# Patient Record
Sex: MALE | Race: WHITE | NOT HISPANIC OR LATINO | Employment: OTHER | URBAN - METROPOLITAN AREA
[De-identification: names, ages, dates, MRNs, and addresses within clinical notes are randomized per-mention and may not be internally consistent; named-entity substitution may affect disease eponyms.]

---

## 2017-01-01 ENCOUNTER — OFFICE VISIT (OUTPATIENT)
Dept: FAMILY MEDICINE | Facility: CLINIC | Age: 77
End: 2017-01-01
Payer: MEDICARE

## 2017-01-01 ENCOUNTER — TELEPHONE (OUTPATIENT)
Dept: CARDIOLOGY | Facility: CLINIC | Age: 77
End: 2017-01-01

## 2017-01-01 ENCOUNTER — TELEPHONE (OUTPATIENT)
Dept: FAMILY MEDICINE | Facility: CLINIC | Age: 77
End: 2017-01-01

## 2017-01-01 ENCOUNTER — OFFICE VISIT (OUTPATIENT)
Dept: CARDIOLOGY | Facility: CLINIC | Age: 77
End: 2017-01-01
Payer: MEDICARE

## 2017-01-01 ENCOUNTER — LAB VISIT (OUTPATIENT)
Dept: LAB | Facility: HOSPITAL | Age: 77
End: 2017-01-01
Attending: INTERNAL MEDICINE
Payer: MEDICARE

## 2017-01-01 ENCOUNTER — NURSE TRIAGE (OUTPATIENT)
Dept: ADMINISTRATIVE | Facility: CLINIC | Age: 77
End: 2017-01-01

## 2017-01-01 VITALS
HEART RATE: 79 BPM | RESPIRATION RATE: 18 BRPM | DIASTOLIC BLOOD PRESSURE: 80 MMHG | OXYGEN SATURATION: 97 % | HEIGHT: 71 IN | WEIGHT: 174.81 LBS | SYSTOLIC BLOOD PRESSURE: 114 MMHG | BODY MASS INDEX: 24.47 KG/M2

## 2017-01-01 VITALS
OXYGEN SATURATION: 99 % | WEIGHT: 176.81 LBS | HEART RATE: 80 BPM | SYSTOLIC BLOOD PRESSURE: 106 MMHG | HEIGHT: 71 IN | DIASTOLIC BLOOD PRESSURE: 78 MMHG | BODY MASS INDEX: 24.75 KG/M2

## 2017-01-01 DIAGNOSIS — I35.0 SEVERE AORTIC STENOSIS: Chronic | ICD-10-CM

## 2017-01-01 DIAGNOSIS — R60.0 EDEMA OF LEFT LOWER EXTREMITY: ICD-10-CM

## 2017-01-01 DIAGNOSIS — I35.0 AORTIC VALVE STENOSIS, UNSPECIFIED ETIOLOGY: ICD-10-CM

## 2017-01-01 DIAGNOSIS — I35.0 AORTIC VALVE STENOSIS, UNSPECIFIED ETIOLOGY: Primary | ICD-10-CM

## 2017-01-01 DIAGNOSIS — N18.9 CHRONIC KIDNEY DISEASE, UNSPECIFIED STAGE: ICD-10-CM

## 2017-01-01 DIAGNOSIS — H61.23 BILATERAL IMPACTED CERUMEN: Primary | ICD-10-CM

## 2017-01-01 LAB
ANION GAP SERPL CALC-SCNC: 7 MMOL/L
APTT BLDCRRT: 23.9 SEC
BUN SERPL-MCNC: 20 MG/DL
CALCIUM SERPL-MCNC: 9.2 MG/DL
CHLORIDE SERPL-SCNC: 101 MMOL/L
CO2 SERPL-SCNC: 28 MMOL/L
CREAT SERPL-MCNC: 1.4 MG/DL
ERYTHROCYTE [DISTWIDTH] IN BLOOD BY AUTOMATED COUNT: 16.5 %
EST. GFR  (AFRICAN AMERICAN): 56.8 ML/MIN/1.73 M^2
EST. GFR  (NON AFRICAN AMERICAN): 49.1 ML/MIN/1.73 M^2
GLUCOSE SERPL-MCNC: 197 MG/DL
HCT VFR BLD AUTO: 39.1 %
HGB BLD-MCNC: 12.5 G/DL
INR PPP: 1.1
MCH RBC QN AUTO: 26.9 PG
MCHC RBC AUTO-ENTMCNC: 32 %
MCV RBC AUTO: 84 FL
PLATELET # BLD AUTO: 281 K/UL
PMV BLD AUTO: 10.4 FL
POTASSIUM SERPL-SCNC: 4.1 MMOL/L
PROTHROMBIN TIME: 11.5 SEC
RBC # BLD AUTO: 4.64 M/UL
SODIUM SERPL-SCNC: 136 MMOL/L
WBC # BLD AUTO: 8.65 K/UL

## 2017-01-01 PROCEDURE — 3078F DIAST BP <80 MM HG: CPT | Mod: S$GLB,,, | Performed by: INTERNAL MEDICINE

## 2017-01-01 PROCEDURE — 3079F DIAST BP 80-89 MM HG: CPT | Mod: S$GLB,,, | Performed by: NURSE PRACTITIONER

## 2017-01-01 PROCEDURE — 1157F ADVNC CARE PLAN IN RCRD: CPT | Mod: S$GLB,,, | Performed by: NURSE PRACTITIONER

## 2017-01-01 PROCEDURE — 99213 OFFICE O/P EST LOW 20 MIN: CPT | Mod: S$GLB,,, | Performed by: NURSE PRACTITIONER

## 2017-01-01 PROCEDURE — 1160F RVW MEDS BY RX/DR IN RCRD: CPT | Mod: S$GLB,,, | Performed by: NURSE PRACTITIONER

## 2017-01-01 PROCEDURE — 99215 OFFICE O/P EST HI 40 MIN: CPT | Mod: S$GLB,,, | Performed by: INTERNAL MEDICINE

## 2017-01-01 PROCEDURE — 1157F ADVNC CARE PLAN IN RCRD: CPT | Mod: S$GLB,,, | Performed by: INTERNAL MEDICINE

## 2017-01-01 PROCEDURE — 1125F AMNT PAIN NOTED PAIN PRSNT: CPT | Mod: S$GLB,,, | Performed by: INTERNAL MEDICINE

## 2017-01-01 PROCEDURE — 99999 PR PBB SHADOW E&M-EST. PATIENT-LVL IV: CPT | Mod: PBBFAC,,, | Performed by: NURSE PRACTITIONER

## 2017-01-01 PROCEDURE — 99999 PR PBB SHADOW E&M-EST. PATIENT-LVL III: CPT | Mod: PBBFAC,,,

## 2017-01-01 PROCEDURE — 1125F AMNT PAIN NOTED PAIN PRSNT: CPT | Mod: S$GLB,,, | Performed by: NURSE PRACTITIONER

## 2017-01-01 PROCEDURE — 99499 UNLISTED E&M SERVICE: CPT | Mod: S$GLB,,, | Performed by: INTERNAL MEDICINE

## 2017-01-01 PROCEDURE — 1160F RVW MEDS BY RX/DR IN RCRD: CPT | Mod: S$GLB,,, | Performed by: INTERNAL MEDICINE

## 2017-01-01 PROCEDURE — 3074F SYST BP LT 130 MM HG: CPT | Mod: S$GLB,,, | Performed by: INTERNAL MEDICINE

## 2017-01-01 PROCEDURE — 3074F SYST BP LT 130 MM HG: CPT | Mod: S$GLB,,, | Performed by: NURSE PRACTITIONER

## 2017-01-01 PROCEDURE — 1159F MED LIST DOCD IN RCRD: CPT | Mod: S$GLB,,, | Performed by: INTERNAL MEDICINE

## 2017-01-01 PROCEDURE — 1159F MED LIST DOCD IN RCRD: CPT | Mod: S$GLB,,, | Performed by: NURSE PRACTITIONER

## 2017-01-01 RX ORDER — LISINOPRIL 5 MG/1
TABLET ORAL
Status: ON HOLD | COMMUNITY
Start: 2016-01-01 | End: 2017-01-01 | Stop reason: HOSPADM

## 2017-01-01 RX ORDER — HUMAN INSULIN 100 [USP'U]/ML
INJECTION, SUSPENSION SUBCUTANEOUS
COMMUNITY
Start: 2017-01-01 | End: 2017-01-01 | Stop reason: SDUPTHER

## 2017-01-11 NOTE — TELEPHONE ENCOUNTER
Await cardiac visit on 1/16.   Will not be able to have valve replacement with sugar elevated.  Needs to be consistent with insulin use.

## 2017-01-11 NOTE — TELEPHONE ENCOUNTER
"Called Lashay with [facility], pt has a concern is his weight loss. 12/24/16 weight was 180. 12/26/17 weight was 179. 1/11/17 weight was 170. He stated he doesn't have an appetite. 1/11/17 his blood sugar was 302. 1/6/17 BS was 277. 1/3/17 BS was 160 and 1/1/17 BS was 293.   He stated he don't take his meds all the time because he is "lazy". Takes insulin when he wants to.     2+ pitting edema on left foot, Doesn't have fluid all the time on left leg. he c/o sob at night. 1/16/17 going to the valve clinic to goal is to have heart valve replacement.    He has a wound to his left index finger, they got wound care orders from wound care clinic.      She wants to know if you want to do an over night pulse ox ??   "

## 2017-01-16 NOTE — PROGRESS NOTES
Cardiology Clinic Note  Reason for Visit: TAVR evaluation     Referring MD: Haydee     HPI:   76 year old male with history of CAD, arthritis, DM, HTN and severe AS is here for TAVR evaluation. He is unable to walk due to leg weakness and instability which has not been evaluated.  He lives alone but only walks around the house with a walker to the bathroom.  Doesn't exercise.  Was sent to SNF for prehab, but didn't accomplish this.  Also, still has an infected finger on his L hand from an accident. He was re-scheduled for TAVR on December 6th, however he was admitted from SNF on 11/18/16 with weakness and recurrent GIB and anemia.   He is s/p multiple GIB with multiple transfusions and polypectomy with successful cessation of GIB. He was Cohort C and too frail to undergo TAVR and is still cohort C. He was sent to Rehab and the plan was to come back to the clinic in a month to see if he has sufficiently rehabbed, but he didn't. Recurrent syncope resolved after PPM.      He has undergone the following work-up:      ¨ Echo (9/14/16): TONIO = 0.62 cm2, peak velocity = 4.82 m/s, mean gradient = 53.0 mmHg, EF= 60%   ¨ Severe, symptomatic AS, NHYA CLAss III.  ¨ LHC (9/15/16): s/p distal RCA and PLB BMS, otherwise non-obstructive CAD by [facility] on 9/12  ¨ STS= 2.7%  ¨ Frailty: 4/4  ¨ Iliacs are >8.3 on R and >7.8 on L  ¨ LVOT per SRR: Area= 4.38 cm2, Avg Diam= 23.6 (22.7 x 26.3 mm).   ¨ He is high risk per Dr. Burleson and Dr. Watson due to ascending aorta calcification. Currently Cohort C due to frailty and immobility.  ¨ Screen failure for Portico for GIB.  ¨ PPM in situ       He is a candidate for 29 mm  Evolut  via RTF access if he Prehabs successfully.      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 ENDO (2ND FLR);  Service: Endoscopy;  Laterality: N/A;    Colonoscopy N/A 10/14/2016     Procedure: COLONOSCOPY;  Surgeon: Carmen Alberto MD;  Location: [facility] ENDO (2ND FLR);  Service: Endoscopy;  Laterality: N/A;    Colonoscopy N/A 11/2/2016     Procedure: COLONOSCOPY;  Surgeon: Song Multani MD;  Location: [facility] ENDO (2ND FLR);  Service: Endoscopy;  Laterality: N/A;  GI bleeding melena and two last colonoscopies poor prep and patient past due for colonoscopy for history of colon villous adenoma surveillance.     Allergies:     Review of patient's allergies indicates:   Allergen Reactions    Adhesive Rash     Coban, severe rash     Medications:     Current Outpatient Prescriptions on File Prior to Visit   Medication Sig Dispense Refill    aspirin 81 MG Chew Take 1 tablet (81 mg total) by mouth once daily.  0    atorvastatin (LIPITOR) 80 MG tablet Take 1 tablet (80 mg total) by mouth once daily. 90 tablet 3    clopidogrel (PLAVIX) 75 mg tablet Take 1 tablet (75 mg total) by mouth once daily. 30 tablet 11    ferrous sulfate 325 (65 FE) MG EC tablet Take 1 tablet (325 mg total) by mouth 3 (three) times daily. 90 tablet 0    gabapentin (NEURONTIN) 300 MG capsule Take 1 capsule (300 mg total) by mouth 3 (three) times daily. 90 capsule 11    insulin NPH and regular human (HUMULIN 70/30 KWIKPEN) 100 unit/mL (70-30) InPn pen Inject 18 units into the skin before breakfast and 9 units before dinner 3 Box 3    miconazole nitrate 2% (MICOTIN) 2 % Oint  "Apply topically 2 (two) times daily. Apply to affected areas  0    midodrine (PROAMATINE) 5 MG Tab Take 1 tablet (5 mg total) by mouth 3 (three) times daily. 90 tablet 3    nitroGLYCERIN (NITROSTAT) 0.4 MG SL tablet Place 1 tablet (0.4 mg total) under the tongue every 5 (five) minutes as needed for Chest pain. 10 tablet 3    pantoprazole (PROTONIX) 40 MG tablet Take 1 tablet (40 mg total) by mouth 2 (two) times daily before meals. 60 tablet 2    insulin syringe-needle U-100 1 mL 29 gauge x 1/2" Syrg        No current facility-administered medications on file prior to visit.      Social History:     Social History   Substance Use Topics    Smoking status: Former Smoker    Smokeless tobacco: Not on file    Alcohol use No     Family History:     Family History   Problem Relation Age of Onset    Kidney disease Brother     No Known Problems Mother     No Known Problems Father     No Known Problems Sister     No Known Problems Maternal Aunt     No Known Problems Maternal Uncle     No Known Problems Paternal Aunt     No Known Problems Paternal Uncle     No Known Problems Maternal Grandmother     No Known Problems Maternal Grandfather     No Known Problems Paternal Grandmother     No Known Problems Paternal Grandfather     Anemia Neg Hx     Arrhythmia Neg Hx     Asthma Neg Hx     Clotting disorder Neg Hx     Fainting Neg Hx     Heart attack Neg Hx     Heart disease Neg Hx     Heart failure Neg Hx     Hyperlipidemia Neg Hx     Hypertension Neg Hx     Stroke Neg Hx     Atrial Septal Defect Neg Hx      Physical Exam:     Visit Vitals    /78 (BP Location: Left arm, Patient Position: Sitting, BP Method: Manual)    Pulse 80    Ht 5' 10.5" (1.791 m)    Wt 80.2 kg (176 lb 12.9 oz)    SpO2 99%    BMI 25.01 kg/m2        Constitutional: No acute distress, conversant  HEENT: Sclera anicteric, Pupils equal, round and reactive to light, extraocular motions intact, Oropharynx clear  Neck: No JVD, " no carotid bruits  Cardiovascular: regular rate and rhythm, mid systolic murmur 2nd ICS, rubs or gallops, normal S1/S2  Pulmonary: Clear to auscultation bilaterally  Abdominal: Abdomen soft, nontender, nondistended, positive bowel sounds  Extremities: bilateral lower extremity edema,   Psych: Alert and oriented x 3, appropriate affect  Neuro: CNII-XII intact, no focal deficits    Labs:     Lab Results   Component Value Date     2016    K 5.4 (H) 2016     2016    CO2 26 2016    BUN 25 (H) 2016    CREATININE 1.3 2016    ANIONGAP 10 2016     Lab Results   Component Value Date    AST 20 2016    ALT 25 2016    ALKPHOS 169 (H) 2016    BILITOT 0.8 2016    ALBUMIN 3.4 (L) 2016     Lab Results   Component Value Date    CALCIUM 9.0 2016    MG 2.0 2016    PHOS 3.6 2016     Lab Results   Component Value Date    BNP 1194 (H) 10/28/2016    BNP 71 2011     (H) 2010    Lab Results   Component Value Date    WBC 8.65 2017    HGB 12.5 (L) 2017    HCT 39.1 (L) 2017     2017    GRAN 5.9 2016    GRAN 75.4 (H) 2016     Lab Results   Component Value Date    INR 1.1 2017    INR 1.1 10/04/2016     Lab Results   Component Value Date    CHOL 296 (H) 2016    HDL 45 2016    LDLCALC 198.0 (H) 2016    TRIG 265 (H) 2016     Lab Results   Component Value Date    HGBA1C 6.7 (H) 10/29/2016     Lab Results   Component Value Date    TSH 1.248 2016          Imagin/14/16:  1 - Severe aortic stenosis, TONIO = 0.62 cm2, peak velocity = 4.82 m/s, mean gradient = 53.0 mmHg.     2 - Mild aortic regurgitation.     3 - Concentric remodeling.     4 - Normal left ventricular systolic function (EF 60-65%).     5 - Mild mitral regurgitation.     6 - Severe left atrial enlargement.     7 - The estimated PA systolic pressure is 31 mmHg.     8 - Right ventricular  enlargement with hypertrophy, with normal systolic function.     9 - Mild tricuspid regurgitation.     10 - Intermediate central venous pressure.    EF   Date Value Ref Range Status   09/14/2016 60 55 - 65    09/09/2016 50 55 - 65    09/11/2015 55 55 - 65        EKG: a paced with RBBB   Assessment:   1. Severe AS (NYHA Class II sx).      He has undergone the following work-up:      ¨ Echo (9/14/16): TONIO = 0.62 cm2, peak velocity = 4.82 m/s, mean gradient = 53.0 mmHg, EF= 60%   ¨ Severe, symptomatic AS, NHYA CLAss III.  ¨ LHC (9/15/16): s/p distal RCA and PLB BMS, otherwise non-obstructive CAD by LHC on 9/12  ¨ STS= 2.7%  ¨ Frailty: 4/4  ¨ Iliacs are >8.3 on R and >7.8 on L  ¨ LVOT per SRR: Area= 4.38 cm2, Avg Diam= 23.6 (22.7 x 26.3 mm).   ¨ He is high risk per Dr. Burleson and Dr. Watson due to ascending aorta calcification. Currently Cohort C due to frailty and immobility.  ¨ Screen failure for Portico for GIB.  ¨ PPM in situ       He is a candidate for 29 mm  Evolut  via RTF access if he Prehabs successfully.       2. Left 2nd finger is healing according to the patient         3. Still remains 4/4 frail, lives alone and there is also question of his adherence with medications   4. PPM in situ for syncope and CHB  5. Fraily        Plan:   1. Continue on current medications  2. He and his nephew were counseled about having him walk as much as possible  3. Will follow him up on 3/6/17 to see if his frailty has improved and then schedule him for TAVR  4. He will follow up with his PCP to arrange to get his gait evaluated and appropriate referral.   5. Pt seen and case discussed with Dr Guadalupe     Signed:  Jacek Mann MD  Interventional / Structural  Cardiology Fellow  Beeper:  [phone]  1/16/2017 1:20 PM    Staff:  I have personally taken the history and examined this patient and agree with the fellow's note as stated above and amended it accordingly :-)    Patient is cohort C and will try for a third time to prehab  at home.  Marques has agreed to help us with him.  Will do TAVR if he successfully prehabs.    Follow-up:     Future Appointments  Date Time Provider Department Center   1/17/2017 3:00 PM Mekhi Sharma MD [facility] - OPP   3/15/2017 10:15 AM Mikey Lange MD [facility]

## 2017-01-16 NOTE — TELEPHONE ENCOUNTER
Called Lashay, advised of Dr. Lange message and she verbally understood.     Pt is seeing valve clinic today, please advise.

## 2017-01-20 NOTE — PROGRESS NOTES
Subjective:       Patient ID: Kevin Ruff is a 77 y.o. male.    Chief Complaint: Edema (fluid in feet)    Edema   This is a recurrent problem. The current episode started more than 1 year ago. The problem occurs constantly. The problem has been waxing and waning. Pertinent negatives include no abdominal pain, anorexia, arthralgias, change in bowel habit, chest pain, chills, congestion, coughing, fatigue, fever, headaches, joint swelling, myalgias, nausea, neck pain, numbness, rash, sore throat, swollen glands, urinary symptoms, vertigo, visual change, vomiting or weakness.     Review of Systems   Constitutional: Negative for chills, fatigue and fever.   HENT: Negative for congestion and sore throat.    Respiratory: Negative for cough.    Cardiovascular: Negative for chest pain.   Gastrointestinal: Negative for abdominal pain, anorexia, change in bowel habit, nausea and vomiting.   Musculoskeletal: Negative for arthralgias, joint swelling, myalgias and neck pain.   Skin: Negative for rash.   Neurological: Negative for vertigo, weakness, numbness and headaches.       Objective:      Physical Exam   Constitutional: He is oriented to person, place, and time. He appears well-nourished.   HENT:   Head: Normocephalic and atraumatic.   Nose: Nose normal.   Mouth/Throat: Oropharynx is clear and moist. No oropharyngeal exudate.   Impacted bilateral ear canals with cerumen   Eyes: Conjunctivae and EOM are normal. Pupils are equal, round, and reactive to light.   Neck: Normal range of motion. Neck supple.   Cardiovascular: Normal rate, regular rhythm, normal heart sounds and intact distal pulses.    Pulmonary/Chest: Effort normal and breath sounds normal. He has no wheezes. He has no rales.   Musculoskeletal:        Right lower leg: He exhibits edema.        Left lower leg: He exhibits edema.   Lymphadenopathy:     He has no cervical adenopathy.   Neurological: He is alert and oriented to person, place, and time.   Skin:  Skin is warm and dry. No rash noted.   Vitals reviewed.      Assessment:       1. Bilateral impacted cerumen    2. Edema of left lower extremity        Plan:       Kevin was seen today for edema.    Diagnoses and all orders for this visit:    Bilateral impacted cerumen  -     Ambulatory referral to ENT    Edema of left lower extremity  Compression stockings.   Elevate extremities

## 2017-01-20 NOTE — MR AVS SNAPSHOT
[facility]  [address]  [place] [address]  Phone: [phone]  Fax: [phone]                  Kevin Ruff   2017 10:20 AM   Office Visit    Description:  Male : 1940   Provider:  Madhavi Patton NP   Department:  [facility]           Reason for Visit     Edema           Diagnoses this Visit        Comments    Bilateral impacted cerumen    -  Primary            To Do List           Future Appointments        Provider Department Dept Phone    2/3/2017 11:30 AM AUDIO, [facility] - Audiology [phone]    2/3/2017 1:00 PM Neli Keith NP [facility] -[phone]    3/6/2017 1:30 PM [facility] [phone]    3/15/2017 10:15 AM Mikey Lange MD [facility] [phone]      Goals (5 Years of Data)     None      [facility]     [facility] Care Line - 24/7 Assistance  Registered nurses in the [facility] provide clinical advisement, health education, appointment booking, and other advisory services.  Call for this free service at [phone].             Medications           Message regarding Medications     Verify the changes and/or additions to your medication regime listed below are the same as discussed with your clinician today.  If any of these changes or additions are incorrect, please notify your healthcare provider.        STOP taking these medications     NOVOLIN 70/30 100 unit/mL (70-30) injection            Verify that the below list of medications is an accurate representation of the medications you are currently taking.  If none reported, the list may be blank. If incorrect, please contact your healthcare provider. Carry this list with you in case of emergency.           Current Medications     aspirin 81 MG Chew Take 1 tablet (81 mg total) by mouth once daily.    atorvastatin (LIPITOR) 80 MG tablet Take 1 tablet (80 mg  "total) by mouth once daily.    clopidogrel (PLAVIX) 75 mg tablet Take 1 tablet (75 mg total) by mouth once daily.    ferrous sulfate 325 (65 FE) MG EC tablet Take 1 tablet (325 mg total) by mouth 3 (three) times daily.    gabapentin (NEURONTIN) 300 MG capsule Take 1 capsule (300 mg total) by mouth 3 (three) times daily.    insulin NPH and regular human (HUMULIN 70/30 KWIKPEN) 100 unit/mL (70-30) InPn pen Inject 18 units into the skin before breakfast and 9 units before dinner    insulin syringe-needle U-100 1 mL 29 gauge x 1/2" Syrg     lisinopril (PRINIVIL,ZESTRIL) 5 MG tablet     miconazole nitrate 2% (MICOTIN) 2 % Oint Apply topically 2 (two) times daily. Apply to affected areas    midodrine (PROAMATINE) 5 MG Tab Take 1 tablet (5 mg total) by mouth 3 (three) times daily.    nitroGLYCERIN (NITROSTAT) 0.4 MG SL tablet Place 1 tablet (0.4 mg total) under the tongue every 5 (five) minutes as needed for Chest pain.    pantoprazole (PROTONIX) 40 MG tablet Take 1 tablet (40 mg total) by mouth 2 (two) times daily before meals.           Clinical Reference Information           Vital Signs - Last Recorded  Most recent update: 1/20/2017 10:13 AM by Marii Matias LPN    BP Pulse Resp Ht Wt SpO2    114/80 79 18 5' 10.5" (1.791 m) 79.3 kg (174 lb 13.2 oz) 97%    BMI                24.73 kg/m2          Blood Pressure          Most Recent Value    BP  114/80      Allergies as of 1/20/2017     Adhesive      Immunizations Administered on Date of Encounter - 1/20/2017     None      Orders Placed During Today's Visit      Normal Orders This Visit    Ambulatory referral to ENT       [facility] Sign-Up     Activating your [facility] account is as easy as 1-2-3!     1) Visit [email or web address], select Sign Up Now, enter this activation code and your date of birth, then select Next.  [email or web address]  Expires: 2/13/2017 10:52 AM      2) Create a username and password to use when you visit [facility] in the future and select a security " question in case you lose your password and select Next.    3) Enter your e-mail address and click Sign Up!    Additional Information  If you have questions, please e-mail [email or web address] or call [phone] to talk to our [facility] staff. Remember, [facility] is NOT to be used for urgent needs. For medical emergencies, dial 911.
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
## 2017-02-05 PROBLEM — A41.9 SEPTIC SHOCK: Status: ACTIVE | Noted: 2017-01-01

## 2017-02-05 PROBLEM — R74.01 TRANSAMINITIS: Status: ACTIVE | Noted: 2017-01-01

## 2017-02-05 PROBLEM — R57.0 CARDIOGENIC SHOCK: Status: ACTIVE | Noted: 2017-01-01

## 2017-02-05 PROBLEM — R65.21 SEPTIC SHOCK: Status: ACTIVE | Noted: 2017-01-01

## 2017-02-08 PROBLEM — I50.21 ACUTE SYSTOLIC HEART FAILURE: Status: ACTIVE | Noted: 2017-01-01

## 2017-02-09 PROBLEM — E08.49: Status: ACTIVE | Noted: 2017-01-01

## 2017-02-12 PROBLEM — M79.89 SWELLING OF RIGHT HAND: Status: ACTIVE | Noted: 2017-01-01

## 2017-02-20 PROBLEM — T25.021A BURN OF RIGHT FOOT: Status: ACTIVE | Noted: 2017-01-01

## 2017-02-20 PROBLEM — N17.9 ACUTE KIDNEY INJURY: Status: ACTIVE | Noted: 2017-01-01

## 2017-03-06 NOTE — TELEPHONE ENCOUNTER
Patient was scheduled for follow up in valve clinic and did not show up.  Called and left a message to call back to reschedule.

## 2017-03-10 PROBLEM — R60.0 BILATERAL EDEMA OF LOWER EXTREMITY: Status: ACTIVE | Noted: 2017-01-01

## 2017-03-10 PROBLEM — L89.153 DECUBITUS ULCER OF SACRAL REGION, STAGE 3: Status: ACTIVE | Noted: 2017-01-01

## 2017-03-10 PROBLEM — J81.0 ACUTE PULMONARY EDEMA: Status: ACTIVE | Noted: 2017-01-01

## 2017-03-10 PROBLEM — I50.23 ACUTE ON CHRONIC SYSTOLIC CONGESTIVE HEART FAILURE: Status: ACTIVE | Noted: 2017-01-01

## 2017-03-10 PROBLEM — D72.829 LEUKOCYTOSIS: Status: ACTIVE | Noted: 2017-01-01

## 2017-03-21 PROBLEM — I50.9: Status: ACTIVE | Noted: 2017-01-01

## 2017-03-22 PROBLEM — R41.0 CONFUSION: Status: ACTIVE | Noted: 2017-01-01

## 2017-06-12 PROBLEM — J81.0 ACUTE PULMONARY EDEMA: Status: RESOLVED | Noted: 2017-01-01 | Resolved: 2017-06-12
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
